# Patient Record
Sex: FEMALE | Race: WHITE | NOT HISPANIC OR LATINO | URBAN - METROPOLITAN AREA
[De-identification: names, ages, dates, MRNs, and addresses within clinical notes are randomized per-mention and may not be internally consistent; named-entity substitution may affect disease eponyms.]

---

## 2018-09-29 ENCOUNTER — EMERGENCY (EMERGENCY)
Facility: HOSPITAL | Age: 58
LOS: 0 days | Discharge: HOME | End: 2018-09-29
Attending: EMERGENCY MEDICINE | Admitting: EMERGENCY MEDICINE

## 2018-09-29 VITALS
RESPIRATION RATE: 19 BRPM | HEIGHT: 60 IN | DIASTOLIC BLOOD PRESSURE: 103 MMHG | HEART RATE: 108 BPM | OXYGEN SATURATION: 98 % | WEIGHT: 165.35 LBS | TEMPERATURE: 97 F | SYSTOLIC BLOOD PRESSURE: 200 MMHG

## 2018-09-29 VITALS — HEART RATE: 92 BPM

## 2018-09-29 DIAGNOSIS — I10 ESSENTIAL (PRIMARY) HYPERTENSION: ICD-10-CM

## 2018-09-29 NOTE — ED ADULT TRIAGE NOTE - CHIEF COMPLAINT QUOTE
" My blood pressure was high x few days." " My blood pressure was high x few days."  Just started on Amlodipine

## 2018-09-29 NOTE — ED PROVIDER NOTE - OBJECTIVE STATEMENT
57 y/o F with PMH of HTN presents for evaluation of elevated BP this morning. Patient was seen by PMD 3 days ago and started on CCB in addition to metoprolol. Patient denies any HA, n/v. No CP, SOB. No abd pain. Patient is asymptomatic.

## 2018-09-29 NOTE — ED PROVIDER NOTE - PROGRESS NOTE DETAILS
Patient will follow-up with her PMD in [place]. Return precautions given. Patient is asymptomatic.

## 2018-11-08 PROBLEM — Z00.00 ENCOUNTER FOR PREVENTIVE HEALTH EXAMINATION: Status: ACTIVE | Noted: 2018-11-08

## 2018-12-24 ENCOUNTER — APPOINTMENT (OUTPATIENT)
Dept: OTOLARYNGOLOGY | Facility: CLINIC | Age: 58
End: 2018-12-24

## 2018-12-25 ENCOUNTER — EMERGENCY (EMERGENCY)
Facility: HOSPITAL | Age: 58
LOS: 0 days | Discharge: HOME | End: 2018-12-26
Attending: EMERGENCY MEDICINE | Admitting: EMERGENCY MEDICINE

## 2018-12-25 VITALS
HEART RATE: 110 BPM | OXYGEN SATURATION: 99 % | SYSTOLIC BLOOD PRESSURE: 216 MMHG | WEIGHT: 15.43 LBS | RESPIRATION RATE: 20 BRPM | TEMPERATURE: 97 F | DIASTOLIC BLOOD PRESSURE: 113 MMHG

## 2018-12-25 DIAGNOSIS — Z90.49 ACQUIRED ABSENCE OF OTHER SPECIFIED PARTS OF DIGESTIVE TRACT: ICD-10-CM

## 2018-12-25 DIAGNOSIS — I10 ESSENTIAL (PRIMARY) HYPERTENSION: ICD-10-CM

## 2018-12-25 DIAGNOSIS — R10.9 UNSPECIFIED ABDOMINAL PAIN: ICD-10-CM

## 2018-12-25 DIAGNOSIS — R11.2 NAUSEA WITH VOMITING, UNSPECIFIED: ICD-10-CM

## 2018-12-25 DIAGNOSIS — R19.7 DIARRHEA, UNSPECIFIED: ICD-10-CM

## 2018-12-26 VITALS — SYSTOLIC BLOOD PRESSURE: 173 MMHG | DIASTOLIC BLOOD PRESSURE: 81 MMHG | HEART RATE: 99 BPM

## 2018-12-26 DIAGNOSIS — Z90.49 ACQUIRED ABSENCE OF OTHER SPECIFIED PARTS OF DIGESTIVE TRACT: Chronic | ICD-10-CM

## 2018-12-26 LAB
ALBUMIN SERPL ELPH-MCNC: 4.5 G/DL — SIGNIFICANT CHANGE UP (ref 3.5–5.2)
ALP SERPL-CCNC: 116 U/L — HIGH (ref 30–115)
ALT FLD-CCNC: 16 U/L — SIGNIFICANT CHANGE UP (ref 0–41)
ANION GAP SERPL CALC-SCNC: 16 MMOL/L — HIGH (ref 7–14)
APTT BLD: 29 SEC — SIGNIFICANT CHANGE UP (ref 27–39.2)
AST SERPL-CCNC: 25 U/L — SIGNIFICANT CHANGE UP (ref 0–41)
BASOPHILS # BLD AUTO: 0.04 K/UL — SIGNIFICANT CHANGE UP (ref 0–0.2)
BASOPHILS NFR BLD AUTO: 0.2 % — SIGNIFICANT CHANGE UP (ref 0–1)
BILIRUB DIRECT SERPL-MCNC: <0.2 MG/DL — SIGNIFICANT CHANGE UP (ref 0–0.2)
BILIRUB INDIRECT FLD-MCNC: SIGNIFICANT CHANGE UP MG/DL (ref 0.2–1.2)
BILIRUB SERPL-MCNC: <0.2 MG/DL — SIGNIFICANT CHANGE UP (ref 0.2–1.2)
BUN SERPL-MCNC: 16 MG/DL — SIGNIFICANT CHANGE UP (ref 10–20)
CALCIUM SERPL-MCNC: 9.3 MG/DL — SIGNIFICANT CHANGE UP (ref 8.5–10.1)
CHLORIDE SERPL-SCNC: 104 MMOL/L — SIGNIFICANT CHANGE UP (ref 98–110)
CO2 SERPL-SCNC: 22 MMOL/L — SIGNIFICANT CHANGE UP (ref 17–32)
CREAT SERPL-MCNC: 0.7 MG/DL — SIGNIFICANT CHANGE UP (ref 0.7–1.5)
EOSINOPHIL # BLD AUTO: 0.08 K/UL — SIGNIFICANT CHANGE UP (ref 0–0.7)
EOSINOPHIL NFR BLD AUTO: 0.4 % — SIGNIFICANT CHANGE UP (ref 0–8)
GLUCOSE SERPL-MCNC: 133 MG/DL — HIGH (ref 70–99)
HCT VFR BLD CALC: 36.5 % — LOW (ref 37–47)
HGB BLD-MCNC: 11.4 G/DL — LOW (ref 12–16)
IMM GRANULOCYTES NFR BLD AUTO: 0.4 % — HIGH (ref 0.1–0.3)
INR BLD: 1.07 RATIO — SIGNIFICANT CHANGE UP (ref 0.65–1.3)
LIDOCAIN IGE QN: 25 U/L — SIGNIFICANT CHANGE UP (ref 7–60)
LYMPHOCYTES # BLD AUTO: 1.24 K/UL — SIGNIFICANT CHANGE UP (ref 1.2–3.4)
LYMPHOCYTES # BLD AUTO: 6.6 % — LOW (ref 20.5–51.1)
MCHC RBC-ENTMCNC: 24.2 PG — LOW (ref 27–31)
MCHC RBC-ENTMCNC: 31.2 G/DL — LOW (ref 32–37)
MCV RBC AUTO: 77.3 FL — LOW (ref 81–99)
MONOCYTES # BLD AUTO: 0.58 K/UL — SIGNIFICANT CHANGE UP (ref 0.1–0.6)
MONOCYTES NFR BLD AUTO: 3.1 % — SIGNIFICANT CHANGE UP (ref 1.7–9.3)
NEUTROPHILS # BLD AUTO: 16.74 K/UL — HIGH (ref 1.4–6.5)
NEUTROPHILS NFR BLD AUTO: 89.3 % — HIGH (ref 42.2–75.2)
NRBC # BLD: 0 /100 WBCS — SIGNIFICANT CHANGE UP (ref 0–0)
PLATELET # BLD AUTO: 298 K/UL — SIGNIFICANT CHANGE UP (ref 130–400)
POTASSIUM SERPL-MCNC: 4.4 MMOL/L — SIGNIFICANT CHANGE UP (ref 3.5–5)
POTASSIUM SERPL-SCNC: 4.4 MMOL/L — SIGNIFICANT CHANGE UP (ref 3.5–5)
PROT SERPL-MCNC: 8 G/DL — SIGNIFICANT CHANGE UP (ref 6–8)
PROTHROM AB SERPL-ACNC: 12.3 SEC — SIGNIFICANT CHANGE UP (ref 9.95–12.87)
RBC # BLD: 4.72 M/UL — SIGNIFICANT CHANGE UP (ref 4.2–5.4)
RBC # FLD: 16.3 % — HIGH (ref 11.5–14.5)
SODIUM SERPL-SCNC: 142 MMOL/L — SIGNIFICANT CHANGE UP (ref 135–146)
WBC # BLD: 18.76 K/UL — HIGH (ref 4.8–10.8)
WBC # FLD AUTO: 18.76 K/UL — HIGH (ref 4.8–10.8)

## 2018-12-26 RX ORDER — SODIUM CHLORIDE 9 MG/ML
3 INJECTION INTRAMUSCULAR; INTRAVENOUS; SUBCUTANEOUS EVERY 8 HOURS
Qty: 0 | Refills: 0 | Status: DISCONTINUED | OUTPATIENT
Start: 2018-12-25 | End: 2018-12-26

## 2018-12-26 RX ORDER — ONDANSETRON 8 MG/1
1 TABLET, FILM COATED ORAL
Qty: 6 | Refills: 0 | OUTPATIENT
Start: 2018-12-26 | End: 2018-12-28

## 2018-12-26 RX ORDER — SODIUM CHLORIDE 9 MG/ML
1000 INJECTION, SOLUTION INTRAVENOUS ONCE
Qty: 0 | Refills: 0 | Status: COMPLETED | OUTPATIENT
Start: 2018-12-26 | End: 2018-12-26

## 2018-12-26 RX ORDER — FAMOTIDINE 10 MG/ML
20 INJECTION INTRAVENOUS ONCE
Qty: 0 | Refills: 0 | Status: COMPLETED | OUTPATIENT
Start: 2018-12-26 | End: 2018-12-26

## 2018-12-26 RX ORDER — METOCLOPRAMIDE HCL 10 MG
10 TABLET ORAL ONCE
Qty: 0 | Refills: 0 | Status: COMPLETED | OUTPATIENT
Start: 2018-12-26 | End: 2018-12-26

## 2018-12-26 RX ORDER — SODIUM CHLORIDE 9 MG/ML
2000 INJECTION, SOLUTION INTRAVENOUS ONCE
Qty: 0 | Refills: 0 | Status: COMPLETED | OUTPATIENT
Start: 2018-12-26 | End: 2018-12-26

## 2018-12-26 RX ADMIN — Medication 10 MILLIGRAM(S): at 00:30

## 2018-12-26 RX ADMIN — FAMOTIDINE 20 MILLIGRAM(S): 10 INJECTION INTRAVENOUS at 01:10

## 2018-12-26 RX ADMIN — SODIUM CHLORIDE 500 MILLILITER(S): 9 INJECTION, SOLUTION INTRAVENOUS at 02:15

## 2018-12-26 RX ADMIN — SODIUM CHLORIDE 500 MILLILITER(S): 9 INJECTION, SOLUTION INTRAVENOUS at 00:30

## 2018-12-26 NOTE — ED PROVIDER NOTE - MEDICAL DECISION MAKING DETAILS
59 yo female with PMH of HTN, anxiety, cholecystectomy presents to the ER for N/V/D and cramping s/p N/V/D. Pt has non tender abdomen, chem panel ok. CBC show leukocytosis likely reactive from vomiting and diarrhea. Repeat exams always with non tender abdomen. Pt tolerated po challenge. Will dc with zofran prescription. Pt already has sucralfate at home. Instructed to patient and son that if pain returns or worsens or localizes she needs to return to the ER. Pt and family understand these instructions.

## 2018-12-26 NOTE — ED ADULT NURSE NOTE - NSIMPLEMENTINTERV_GEN_ALL_ED
Implemented All Universal Safety Interventions:  [place] to call system. Call bell, personal items and telephone within reach. Instruct patient to call for assistance. Room bathroom lighting operational. Non-slip footwear when patient is off stretcher. Physically safe environment: no spills, clutter or unnecessary equipment. Stretcher in lowest position, wheels locked, appropriate side rails in place.

## 2018-12-26 NOTE — ED PROVIDER NOTE - CARE PROVIDER_API CALL
Mary Burch), Gastroenterology  [address] [place] [address]  Phone: [phone]  Fax: [phone]

## 2018-12-26 NOTE — ED PROVIDER NOTE - OBJECTIVE STATEMENT
59 yo female with PMH of HTN, anxiety, cholecystectomy presents to the ER for 57 yo female with PMH of HTN, anxiety, cholecystectomy presents to the ER for N/V/D/abdomen pain for several hours PTA. Pt states she ate an apple and boiled chicken. Soon after starting having N/V x 3-4, then watery diarrhea x 3-4, non bloody. Pt has no fever, no chills, no flank pain, no CP, no SOB, no cough, no rash, no leg swelling/pain. Denies sick contacts, denies travel, denies bad food exposure.     PMH as above  Meds: metoprolol tartrate 50 mg, Escitalopram 10 mg, Amlodipine 10 mg, Sucralfate 1 gm, albuterol MDI  ALL: nkda  SH: no smoking no etoh  PMD Dr Handley

## 2018-12-26 NOTE — ED PROVIDER NOTE - PROGRESS NOTE DETAILS
WBC noted. Likely reactive as pt currently reexamined by me and states nausea improved, no vomiting in the ER and on exam of abdomen has non tender abdomen, no rebound or guarding. Waitinig for Pepcid to go thru iv and chem labs.

## 2018-12-26 NOTE — ED PROVIDER NOTE - NSFOLLOWUPINSTRUCTIONS_ED_ALL_ED_FT
Nausea / Vomiting    Nausea is the feeling that you have an upset stomach or have to vomit. As nausea gets worse, it can lead to vomiting. Vomiting occurs when stomach contents are thrown up and out of the mouth which puts you at an increased risk for dehydration. Older adults and people with other diseases or a weak immune system are at higher risk for dehydration. Drink clear fluids in small but frequent amounts as tolerated. Eat bland, easy-to-digest foods in small amounts as tolerated.     SEEK IMMEDIATE MEDICAL CARE IF YOU HAVE THE FOLLOWING SYMPTOMS: fever, inability to keep fluids down, black or bloody vomitus, black or bloody stools, lightheadedness/dizziness, chest pain, severe headache, rash, shortness of breath, cold or clammy skin, confusion, pain with urination, or any signs of dehydration.     Diarrhea    Diarrhea is frequent loose and watery bowel movements that has many causes. Diarrhea can make you feel weak and cause you to become dehydrated. Diarrhea typically lasts 3-5 days. However, it can last longer if it is a sign of something more serious. Drink clear fluids to prevent dehydration. Eat bland, easy-to-digest foods as tolerated.     SEEK IMMEDIATE MEDICAL CARE IF YOU HAVE THE FOLLOWING SYMPTOMS: fever, lightheadedness/dizziness, chest pain, black or bloody stools, shortness of breath, severe abdominal or back pain, or any signs of dehydration.

## 2020-06-27 ENCOUNTER — EMERGENCY (EMERGENCY)
Facility: HOSPITAL | Age: 60
LOS: 0 days | Discharge: HOME | End: 2020-06-28
Attending: STUDENT IN AN ORGANIZED HEALTH CARE EDUCATION/TRAINING PROGRAM | Admitting: EMERGENCY MEDICINE
Payer: MEDICAID

## 2020-06-27 VITALS
DIASTOLIC BLOOD PRESSURE: 81 MMHG | HEART RATE: 120 BPM | TEMPERATURE: 98 F | RESPIRATION RATE: 18 BRPM | SYSTOLIC BLOOD PRESSURE: 182 MMHG | OXYGEN SATURATION: 98 %

## 2020-06-27 DIAGNOSIS — E87.6 HYPOKALEMIA: ICD-10-CM

## 2020-06-27 DIAGNOSIS — I10 ESSENTIAL (PRIMARY) HYPERTENSION: ICD-10-CM

## 2020-06-27 DIAGNOSIS — R00.2 PALPITATIONS: ICD-10-CM

## 2020-06-27 DIAGNOSIS — Z20.828 CONTACT WITH AND (SUSPECTED) EXPOSURE TO OTHER VIRAL COMMUNICABLE DISEASES: ICD-10-CM

## 2020-06-27 DIAGNOSIS — K21.9 GASTRO-ESOPHAGEAL REFLUX DISEASE WITHOUT ESOPHAGITIS: ICD-10-CM

## 2020-06-27 DIAGNOSIS — Z90.49 ACQUIRED ABSENCE OF OTHER SPECIFIED PARTS OF DIGESTIVE TRACT: Chronic | ICD-10-CM

## 2020-06-27 PROBLEM — F41.9 ANXIETY DISORDER, UNSPECIFIED: Chronic | Status: ACTIVE | Noted: 2018-12-26

## 2020-06-27 LAB
ALBUMIN SERPL ELPH-MCNC: 4.4 G/DL — SIGNIFICANT CHANGE UP (ref 3.5–5.2)
ALP SERPL-CCNC: 91 U/L — SIGNIFICANT CHANGE UP (ref 30–115)
ALT FLD-CCNC: 14 U/L — SIGNIFICANT CHANGE UP (ref 0–41)
ANION GAP SERPL CALC-SCNC: 12 MMOL/L — SIGNIFICANT CHANGE UP (ref 7–14)
ANION GAP SERPL CALC-SCNC: 15 MMOL/L — HIGH (ref 7–14)
APPEARANCE UR: CLEAR — SIGNIFICANT CHANGE UP
AST SERPL-CCNC: 20 U/L — SIGNIFICANT CHANGE UP (ref 0–41)
BASOPHILS # BLD AUTO: 0.01 K/UL — SIGNIFICANT CHANGE UP (ref 0–0.2)
BASOPHILS NFR BLD AUTO: 0.1 % — SIGNIFICANT CHANGE UP (ref 0–1)
BILIRUB DIRECT SERPL-MCNC: <0.2 MG/DL — SIGNIFICANT CHANGE UP (ref 0–0.2)
BILIRUB INDIRECT FLD-MCNC: >0.2 MG/DL — SIGNIFICANT CHANGE UP (ref 0.2–1.2)
BILIRUB SERPL-MCNC: 0.4 MG/DL — SIGNIFICANT CHANGE UP (ref 0.2–1.2)
BILIRUB UR-MCNC: NEGATIVE — SIGNIFICANT CHANGE UP
BUN SERPL-MCNC: 3 MG/DL — LOW (ref 10–20)
BUN SERPL-MCNC: 4 MG/DL — LOW (ref 10–20)
CALCIUM SERPL-MCNC: 8.8 MG/DL — SIGNIFICANT CHANGE UP (ref 8.5–10.1)
CALCIUM SERPL-MCNC: 9.6 MG/DL — SIGNIFICANT CHANGE UP (ref 8.5–10.1)
CHLORIDE SERPL-SCNC: 104 MMOL/L — SIGNIFICANT CHANGE UP (ref 98–110)
CHLORIDE SERPL-SCNC: 106 MMOL/L — SIGNIFICANT CHANGE UP (ref 98–110)
CO2 SERPL-SCNC: 23 MMOL/L — SIGNIFICANT CHANGE UP (ref 17–32)
CO2 SERPL-SCNC: 25 MMOL/L — SIGNIFICANT CHANGE UP (ref 17–32)
COLOR SPEC: SIGNIFICANT CHANGE UP
CREAT SERPL-MCNC: 0.5 MG/DL — LOW (ref 0.7–1.5)
CREAT SERPL-MCNC: 0.6 MG/DL — LOW (ref 0.7–1.5)
D DIMER BLD IA.RAPID-MCNC: 107 NG/ML DDU — SIGNIFICANT CHANGE UP (ref 0–230)
DIFF PNL FLD: NEGATIVE — SIGNIFICANT CHANGE UP
EOSINOPHIL # BLD AUTO: 0.02 K/UL — SIGNIFICANT CHANGE UP (ref 0–0.7)
EOSINOPHIL NFR BLD AUTO: 0.2 % — SIGNIFICANT CHANGE UP (ref 0–8)
GLUCOSE SERPL-MCNC: 103 MG/DL — HIGH (ref 70–99)
GLUCOSE SERPL-MCNC: 113 MG/DL — HIGH (ref 70–99)
GLUCOSE UR QL: NEGATIVE — SIGNIFICANT CHANGE UP
HCT VFR BLD CALC: 37.2 % — SIGNIFICANT CHANGE UP (ref 37–47)
HGB BLD-MCNC: 11.6 G/DL — LOW (ref 12–16)
IMM GRANULOCYTES NFR BLD AUTO: 0.4 % — HIGH (ref 0.1–0.3)
KETONES UR-MCNC: ABNORMAL
LEUKOCYTE ESTERASE UR-ACNC: NEGATIVE — SIGNIFICANT CHANGE UP
LIDOCAIN IGE QN: 11 U/L — SIGNIFICANT CHANGE UP (ref 7–60)
LYMPHOCYTES # BLD AUTO: 1.48 K/UL — SIGNIFICANT CHANGE UP (ref 1.2–3.4)
LYMPHOCYTES # BLD AUTO: 13 % — LOW (ref 20.5–51.1)
MCHC RBC-ENTMCNC: 24.1 PG — LOW (ref 27–31)
MCHC RBC-ENTMCNC: 31.2 G/DL — LOW (ref 32–37)
MCV RBC AUTO: 77.2 FL — LOW (ref 81–99)
MONOCYTES # BLD AUTO: 0.53 K/UL — SIGNIFICANT CHANGE UP (ref 0.1–0.6)
MONOCYTES NFR BLD AUTO: 4.7 % — SIGNIFICANT CHANGE UP (ref 1.7–9.3)
NEUTROPHILS # BLD AUTO: 9.29 K/UL — HIGH (ref 1.4–6.5)
NEUTROPHILS NFR BLD AUTO: 81.6 % — HIGH (ref 42.2–75.2)
NITRITE UR-MCNC: NEGATIVE — SIGNIFICANT CHANGE UP
NRBC # BLD: 0 /100 WBCS — SIGNIFICANT CHANGE UP (ref 0–0)
NT-PROBNP SERPL-SCNC: 226 PG/ML — SIGNIFICANT CHANGE UP (ref 0–300)
PH UR: 6 — SIGNIFICANT CHANGE UP (ref 5–8)
PLATELET # BLD AUTO: 242 K/UL — SIGNIFICANT CHANGE UP (ref 130–400)
POTASSIUM SERPL-MCNC: 3 MMOL/L — LOW (ref 3.5–5)
POTASSIUM SERPL-MCNC: 3.5 MMOL/L — SIGNIFICANT CHANGE UP (ref 3.5–5)
POTASSIUM SERPL-SCNC: 3 MMOL/L — LOW (ref 3.5–5)
POTASSIUM SERPL-SCNC: 3.5 MMOL/L — SIGNIFICANT CHANGE UP (ref 3.5–5)
PROT SERPL-MCNC: 7.3 G/DL — SIGNIFICANT CHANGE UP (ref 6–8)
PROT UR-MCNC: SIGNIFICANT CHANGE UP
RBC # BLD: 4.82 M/UL — SIGNIFICANT CHANGE UP (ref 4.2–5.4)
RBC # FLD: 20.5 % — HIGH (ref 11.5–14.5)
SODIUM SERPL-SCNC: 141 MMOL/L — SIGNIFICANT CHANGE UP (ref 135–146)
SODIUM SERPL-SCNC: 144 MMOL/L — SIGNIFICANT CHANGE UP (ref 135–146)
SP GR SPEC: 1.01 — SIGNIFICANT CHANGE UP (ref 1.01–1.02)
TROPONIN T SERPL-MCNC: <0.01 NG/ML — SIGNIFICANT CHANGE UP
TROPONIN T SERPL-MCNC: <0.01 NG/ML — SIGNIFICANT CHANGE UP
UROBILINOGEN FLD QL: SIGNIFICANT CHANGE UP
WBC # BLD: 11.37 K/UL — HIGH (ref 4.8–10.8)
WBC # FLD AUTO: 11.37 K/UL — HIGH (ref 4.8–10.8)

## 2020-06-27 PROCEDURE — 99220: CPT

## 2020-06-27 PROCEDURE — 93010 ELECTROCARDIOGRAM REPORT: CPT | Mod: 76

## 2020-06-27 PROCEDURE — 71045 X-RAY EXAM CHEST 1 VIEW: CPT | Mod: 26

## 2020-06-27 RX ORDER — POTASSIUM CHLORIDE 20 MEQ
40 PACKET (EA) ORAL ONCE
Refills: 0 | Status: COMPLETED | OUTPATIENT
Start: 2020-06-27 | End: 2020-06-27

## 2020-06-27 RX ORDER — MAGNESIUM SULFATE 500 MG/ML
2 VIAL (ML) INJECTION ONCE
Refills: 0 | Status: COMPLETED | OUTPATIENT
Start: 2020-06-27 | End: 2020-06-27

## 2020-06-27 RX ORDER — SODIUM CHLORIDE 9 MG/ML
1000 INJECTION INTRAMUSCULAR; INTRAVENOUS; SUBCUTANEOUS ONCE
Refills: 0 | Status: COMPLETED | OUTPATIENT
Start: 2020-06-27 | End: 2020-06-27

## 2020-06-27 RX ORDER — PANTOPRAZOLE SODIUM 20 MG/1
40 TABLET, DELAYED RELEASE ORAL ONCE
Refills: 0 | Status: COMPLETED | OUTPATIENT
Start: 2020-06-27 | End: 2020-06-27

## 2020-06-27 RX ORDER — ONDANSETRON 8 MG/1
4 TABLET, FILM COATED ORAL ONCE
Refills: 0 | Status: COMPLETED | OUTPATIENT
Start: 2020-06-27 | End: 2020-06-27

## 2020-06-27 RX ORDER — FAMOTIDINE 10 MG/ML
20 INJECTION INTRAVENOUS ONCE
Refills: 0 | Status: COMPLETED | OUTPATIENT
Start: 2020-06-27 | End: 2020-06-27

## 2020-06-27 RX ADMIN — Medication 40 MILLIEQUIVALENT(S): at 14:43

## 2020-06-27 RX ADMIN — Medication 50 GRAM(S): at 12:42

## 2020-06-27 RX ADMIN — FAMOTIDINE 20 MILLIGRAM(S): 10 INJECTION INTRAVENOUS at 10:42

## 2020-06-27 RX ADMIN — PANTOPRAZOLE SODIUM 40 MILLIGRAM(S): 20 TABLET, DELAYED RELEASE ORAL at 14:29

## 2020-06-27 RX ADMIN — ONDANSETRON 4 MILLIGRAM(S): 8 TABLET, FILM COATED ORAL at 10:43

## 2020-06-27 RX ADMIN — Medication 50 GRAM(S): at 14:41

## 2020-06-27 RX ADMIN — Medication 40 MILLIEQUIVALENT(S): at 11:39

## 2020-06-27 RX ADMIN — SODIUM CHLORIDE 1000 MILLILITER(S): 9 INJECTION INTRAMUSCULAR; INTRAVENOUS; SUBCUTANEOUS at 10:42

## 2020-06-27 RX ADMIN — SODIUM CHLORIDE 1000 MILLILITER(S): 9 INJECTION INTRAMUSCULAR; INTRAVENOUS; SUBCUTANEOUS at 13:44

## 2020-06-27 NOTE — ED ADULT NURSE NOTE - NSIMPLEMENTINTERV_GEN_ALL_ED
Implemented All Universal Safety Interventions:  [place] to call system. Call bell, personal items and telephone within reach. Instruct patient to call for assistance. Room bathroom lighting operational. Non-slip footwear when patient is off stretcher. Physically safe environment: no spills, clutter or unnecessary equipment. Stretcher in lowest position, wheels locked, appropriate side rails in place.

## 2020-06-27 NOTE — ED PROVIDER NOTE - OBJECTIVE STATEMENT
59 y/o female with hx HTN, GERD, Anemia Amharic speaking presents to the ED c/o "I've been having palpitation on-off for 1 month. It is worse after eating. I went to my doctor who gave me some medicine. The palpitations were worse last night." no CP/ SOB/ fever/ chills/ weakness/ abd pain

## 2020-06-27 NOTE — ED CDU PROVIDER INITIAL DAY NOTE - OBJECTIVE STATEMENT
61 y/o female with PMHX of Anxiety and HTN presenting for palpitations. As per pt (via Irish translation from son) for the past month has "felt her heart racing" s/p eating. Pt also complains of epigastric pain and weight loss over the past month 61 y/o female with PMHX of Anxiety and HTN presenting for palpitations. As per pt (via Frisian translation from son) for the past month has "felt her heart racing" s/p eating. Pt also complains of epigastric pain and weight loss over the past month due to the anxiety of the palpitations and pain going to occur. Pt also states palpitations worsen after she takes her Xanax. Denies any dizziness, N/V/D, fever/chills, SOB, vision changes

## 2020-06-27 NOTE — ED CDU PROVIDER INITIAL DAY NOTE - ATTENDING CONTRIBUTION TO CARE
59 yo f hx htn, anemia, anxiety, s/p cholecystectomy and appendectomy (distant) here for abd pain and palpitations. pt states for the past month, she develops palpitations while eating. associated epigastric discomfort and nausea. pt states the pain has made her a anxious with eating so she has been eating less. pt has seen pcp for this and is on carafate and scheduled to get CTAP.  Pt denies GI eval in the past and denies egd. no fever, chills, productive cough (+intermittent dry cough). no black/bloody stool. pt tolerating PO in general. no exertional chest pain. no pleuritic chest pain. no trauma. no syncope. pt endorsing 2-3 bowel movements today- nonbloody.     Pt initially tachycardic in ED to 120, which improved w/ fluids  labs show mild leukocytosis and anemia, + hypokalemia, otherwise negative cardiac labs- trop, ddimer, bnp, negative belly labs- lfts, lipase, ua shows ketones,     vss  gen- NAD, aaox3  card-rrr  lungs-ctab, no wheezing or rhonchi  abd-sntnd, no guarding or rebound, surgical scars noted  neuro- full str/sensation, cn ii-xii grossly intact, normal coordination     likely gastritis/pud, however, given age, will get serial ekg and troponin, given palpitations, will monitor on tele, echo  will continue to hydrate given ketones, replete electrolytes and serial abdominal exam, IV pepcid given    HR <90 during my evaluation and pt endorsing anxiety when she arrived to ED    no indication for CTAP at this point given benign abd, normal LFTs, no e/o obstruction, pt would more likely benefit from close GI f/u for elective EGD

## 2020-06-27 NOTE — ED PROVIDER NOTE - ATTENDING CONTRIBUTION TO CARE
son providing translation at pt request - Pashto     60F PMH gerd, htn, pud, anemia p/w 1 mo of intermittent palpations. states brought on by eating. no cp, sob. no fever, cough. no abd pain, nvdc. no dizziness, loc. no dysuria, freq, hematuria. no brbpr or melena. no cardiologist.     on exam, AFVSS, well federico nad, ncat, eomi, perrla, mmm, lctab, rrr nl s1s2 no mrg, abd soft ntnd, aaox3, no focal deficits, no le edema or calf ttp,     a/p; Palpitations, tachy on arrival cant perc out, will do labs, ekg/trop r/o acs, ddimer r/o pe, r/o anemia/electrolyte abnl, cxr , tele monitor r/o arrythmia, re-eval

## 2020-06-27 NOTE — ED ADULT NURSE NOTE - OBJECTIVE STATEMENT
60 year old female with hx of gerd complaining of palpation for 1 month states it occurs after she eats.

## 2020-06-27 NOTE — ED CDU PROVIDER INITIAL DAY NOTE - PROGRESS NOTE DETAILS
received signout from Andrew Cortés - pt place in obs for anxiety, tachycardia and palpitations after eating; hx of gerd was on carafate; pt with ce/ekg x 2 neg; electrolytes(mg/k) replaced; no cp; pt pending echo in am;

## 2020-06-28 VITALS
DIASTOLIC BLOOD PRESSURE: 78 MMHG | OXYGEN SATURATION: 98 % | RESPIRATION RATE: 18 BRPM | HEART RATE: 88 BPM | TEMPERATURE: 98 F | SYSTOLIC BLOOD PRESSURE: 171 MMHG

## 2020-06-28 LAB — SARS-COV-2 RNA SPEC QL NAA+PROBE: SIGNIFICANT CHANGE UP

## 2020-06-28 PROCEDURE — 93306 TTE W/DOPPLER COMPLETE: CPT | Mod: 26

## 2020-06-28 PROCEDURE — 99217: CPT

## 2020-06-28 RX ORDER — PANTOPRAZOLE SODIUM 20 MG/1
1 TABLET, DELAYED RELEASE ORAL
Qty: 14 | Refills: 0
Start: 2020-06-28 | End: 2020-07-11

## 2020-06-28 NOTE — ED CDU PROVIDER DISPOSITION NOTE - ATTENDING CONTRIBUTION TO CARE
61 yo f hx htn, anemia, anxiety, s/p cholecystectomy and appendectomy (distant) here for abd pain and palpitations. pt states for the past month, she develops palpitations while eating. associated epigastric discomfort and nausea. pt states the pain has made her a anxious with eating so she has been eating less. pt has seen pcp for this and is on carafate and scheduled to get CTAP.  Pt denies GI eval in the past and denies egd. no fever, chills, productive cough (+intermittent dry cough). no black/bloody stool. pt tolerating PO in general. no exertional chest pain. no pleuritic chest pain. no trauma. no syncope. pt endorsing 2-3 bowel movements today- nonbloody.     Pt initially tachycardic in ED to 120, which improved w/ fluids  labs show mild leukocytosis and anemia, + hypokalemia, otherwise negative cardiac labs- trop, ddimer, bnp, negative belly labs- lfts, lipase, ua shows ketone

## 2020-06-28 NOTE — ED CDU PROVIDER DISPOSITION NOTE - CARE PROVIDER_API CALL
Hedy Regalado)  Internal Medicine  [address] [place] [id number]  Phone: [phone]  Fax: [phone]  Follow Up Time:

## 2020-06-28 NOTE — ED CDU PROVIDER DISPOSITION NOTE - CARE PROVIDERS DIRECT ADDRESSES
[email or web address]
no back pain, no gout, no musculoskeletal pain, no neck pain, and no weakness.

## 2020-06-28 NOTE — ED CDU PROVIDER DISPOSITION NOTE - CLINICAL COURSE
In obs, pt kept on telemetry. HR improved w/ fluids. lytes repleted. serial ekg showed improvement of QTc. ECHO normal. Pt tolerating PO. Sx improved w/ protonix. Stable for d/c w/ ppi and gi f/u

## 2020-06-28 NOTE — ED CDU PROVIDER SUBSEQUENT DAY NOTE - HISTORY
pt resting in obs without complaints; son interpreting for pt - pt has home meds with her and instructed to take them as prescribed; pt pending echo in am; covid negative;

## 2020-06-28 NOTE — ED ADULT NURSE REASSESSMENT NOTE - NS ED NURSE REASSESS COMMENT FT1
Echo called for status of patient. Patient awaiting covid-19 test results at this time and cannot be transferred to echo without results. Patient and her son were notified of status and verbalized an understanding. Repeat EKG done and signed by MD Arthur
Pt A+Ox4 and resting comfortably, son at bedside interpreting for pt. Pt denies chest pain/ palpitations/ SOB. Pt awaiting echo.
Patient placed in observation. Continuous cardiac monitoring maintained. IV patent and intact. Patient son at bedside translated Sierra Leonean as per patients wishes. HR improved < 100 bpm after medications. Patient educated on plan of care for echo.

## 2020-06-28 NOTE — ED CDU PROVIDER DISPOSITION NOTE - PATIENT PORTAL LINK FT
You can access the FollowMyHealth Patient Portal offered by [facility] by registering at the following website: [email or web address]. By joining [email or web address]’s FollowMyHealth portal, you will also be able to view your health information using other applications (apps) compatible with our system.

## 2020-06-28 NOTE — ED CDU PROVIDER SUBSEQUENT DAY NOTE - PROGRESS NOTE DETAILS
Patient resting comfortably- feeling better. Was able to eat last night. No palpitations. Will continue to monitor- awaiting echo.

## 2021-05-27 ENCOUNTER — TRANSCRIPTION ENCOUNTER (OUTPATIENT)
Age: 61
End: 2021-05-27

## 2022-11-21 ENCOUNTER — EMERGENCY (EMERGENCY)
Facility: HOSPITAL | Age: 62
LOS: 0 days | Discharge: HOME | End: 2022-11-21
Attending: EMERGENCY MEDICINE | Admitting: EMERGENCY MEDICINE

## 2022-11-21 VITALS
TEMPERATURE: 98 F | SYSTOLIC BLOOD PRESSURE: 209 MMHG | RESPIRATION RATE: 18 BRPM | OXYGEN SATURATION: 99 % | HEART RATE: 100 BPM | DIASTOLIC BLOOD PRESSURE: 102 MMHG

## 2022-11-21 DIAGNOSIS — Z90.49 ACQUIRED ABSENCE OF OTHER SPECIFIED PARTS OF DIGESTIVE TRACT: ICD-10-CM

## 2022-11-21 DIAGNOSIS — S46.912A STRAIN OF UNSPECIFIED MUSCLE, FASCIA AND TENDON AT SHOULDER AND UPPER ARM LEVEL, LEFT ARM, INITIAL ENCOUNTER: ICD-10-CM
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
## 2022-11-21 DIAGNOSIS — M25.512 PAIN IN LEFT SHOULDER: ICD-10-CM

## 2022-11-21 DIAGNOSIS — F41.9 ANXIETY DISORDER, UNSPECIFIED: ICD-10-CM
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
## 2022-11-21 DIAGNOSIS — Z90.49 ACQUIRED ABSENCE OF OTHER SPECIFIED PARTS OF DIGESTIVE TRACT: Chronic | ICD-10-CM
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
## 2022-11-21 DIAGNOSIS — I10 ESSENTIAL (PRIMARY) HYPERTENSION: ICD-10-CM

## 2022-11-21 PROCEDURE — 99283 EMERGENCY DEPT VISIT LOW MDM: CPT

## 2022-11-21 RX ORDER — IBUPROFEN 200 MG
400 TABLET ORAL ONCE
Refills: 0 | Status: COMPLETED | OUTPATIENT
Start: 2022-11-21 | End: 2022-11-21

## 2022-11-21 RX ADMIN — Medication 400 MILLIGRAM(S): at 16:28

## 2022-11-21 NOTE — ED PROVIDER NOTE - NS ED ATTENDING STATEMENT MOD
This was a shared visit with the KARIME. I reviewed and verified the documentation and independently performed the documented:

## 2022-11-21 NOTE — ED PROVIDER NOTE - ATTENDING APP SHARED VISIT CONTRIBUTION OF CARE
62-year-old female history of HTN presents with left shoulder pain today, patient states she was sweeping with a broom, the broom broke and she caught herself with the broom handle and twisted her left shoulder, sx are constant, worse with certain movements and position, better with rest, denies exertional nature to pain, chest pain, SOB, nausea, vomiting, diaphoresis, hemoptysis, fever, tactile temp, chills, paresthesias, focal weakness, or other associated complaints at present. Pt denies recent heavy lifting or trauma. Old chart reviewed. I have reviewed and agree with the initial nursing note, except as documented in my note.    VSS, awake, alert, non-toxic appearing, no skin rash or lesions, chest CTAB, no w/r/r, +S1/S2, RRR, no m/r/g, abdomen soft, NT, ND, +BS, LUE: No scapular, clavicle, AC joint, elbow, wrist, hand tenderness, motor and sensation intact distally, NV intact distally with 2+ radial pulses, shoulder; appears symmetrical, no gross deformity, no anterior fullness of anterior shoulder, humeral head palpated in glenohumeral joint, no crepitus, point tenderness, erythema, warmth, skin breakdown or lesions, diffusely tender to palpation, sensation intact over deltoid region, active ROM painful but intact, passive ROM intact.

## 2022-11-21 NOTE — ED ADULT TRIAGE NOTE - CHIEF COMPLAINT QUOTE
pt was vacuuming when she pulled her left shoulder about  1 hour. able to move arm but has pain. pt has hx of htn. took her BP medsPTA pt was vacuuming when she pulled her left shoulder about  1 hour ago. denies pain when touched. able to move arm but has pain. pt has hx of htn. took her BP medsPTA

## 2022-11-21 NOTE — ED ADULT NURSE NOTE - CHIEF COMPLAINT QUOTE
pt was vacuuming when she pulled her left shoulder about  1 hour ago. denies pain when touched. able to move arm but has pain. pt has hx of htn. took her BP medsPTA

## 2022-11-21 NOTE — ED PROVIDER NOTE - NSFOLLOWUPINSTRUCTIONS_ED_ALL_ED_FT
Follow up with your orthopedic doctor in 5-7 days.  If you do not have an orthopedic doctor, CALL [phone] to find a physician to follow up with.      Shoulder Sprain       A shoulder sprain is a partial or complete tear in one of the tough, fiber-like tissues (ligaments) in the shoulder. The ligaments in the shoulder help to hold the shoulder in place.      What are the causes?    This condition may be caused by:  •A fall.      •A hit to the shoulder.      •A twist of the arm.        What increases the risk?    You are more likely to develop this condition if you:  •Play sports.      •Have problems with balance or coordination.        What are the signs or symptoms?    Symptoms of this condition include:  •Pain when moving the shoulder.      •Limited ability to move the shoulder.      •Swelling and tenderness on top of the shoulder.      •Warmth in the shoulder.      •A change in the shape of the shoulder.      •Redness or bruising on the shoulder.        How is this diagnosed?    This condition is diagnosed with:  •A physical exam. During the exam, you may be asked to do simple exercises with your shoulder.      •Imaging tests such as X-rays, MRI, or a CT scan. These tests can show how severe the sprain is.        How is this treated?    This condition may be treated with:  •Rest.      •Pain medicine.      •Ice.      •A sling or brace. This is used to keep the arm still while the shoulder is healing.      •Physical therapy or rehabilitation exercises. These help to improve the range of motion and strength of the shoulder.      •Surgery (rare). Surgery may be needed if the sprain caused a joint to become unstable. Surgery may also be needed to reduce pain.      Some people may develop ongoing shoulder pain or lose some range of motion in the shoulder. However, most people do not develop long-term problems.      Follow these instructions at home:     If you have a sling or brace:     •Wear the sling or brace as told by your health care provider. Remove it only as told by your health care provider.      •Loosen the sling or brace if your fingers tingle, become numb, or turn cold and blue.      •Keep the sling or brace clean.    •If the sling or brace is not waterproof:  •Do not let it get wet.      •Cover it with a watertight covering when you take a bath or shower.        Activity     •Rest your shoulder.      •Move your arm only as much as told by your health care provider, but move your hand and fingers often to prevent stiffness and swelling.      •Return to your normal activities as told by your health care provider. Ask your health care provider what activities are safe for you.      •Ask your health care provider when it is safe for you to drive if you have a sling or brace on your shoulder.      •If you were shown how to do any exercises, do them as told by your health care provider.      General instructions   •If directed, put ice on the affected area.  •Put ice in a plastic bag.      •Place a towel between your skin and the bag.      •Leave the ice on for 20 minutes, 2–3 times a day.        •Take over-the-counter and prescription medicines only as told by your health care provider.      • Do not use any products that contain nicotine or tobacco, such as cigarettes, e-cigarettes, and chewing tobacco. These can delay healing. If you need help quitting, ask your health care provider.      •Keep all follow-up visits as told by your health care provider. This is important.        Contact a health care provider if:    •Your pain gets worse.      •Your pain is not relieved with medicines.      •You have increased redness or swelling.        Get help right away if:    •You have a fever.      •You cannot move your arm or shoulder.      •You develop severe numbness or tingling in your arm, hand, or fingers.      •Your arm, hand, or fingers feel cold and turn blue, white, or gray.        Summary    •A shoulder sprain is a partial or complete tear in one of the tough, fiber-like tissues (ligaments) in the shoulder.      •This condition may be caused by a fall, a hit to the shoulder, or a twist of the arm.      •Treatment usually includes rest, ice, and pain medicine as needed.      •If you have a sling or brace, wear it as told by your health care provider. Remove it only as told by your health care provider.

## 2022-11-21 NOTE — ED PROVIDER NOTE - PHYSICAL EXAMINATION
Gen: NAD, AOx3  Head: NCAT  HEENT: PERRL, oral mucosa moist, normal conjunctiva, oropharynx clear without exudate or erythema  Lung: CTAB, no respiratory distress, no wheezing, rales, rhonchi  CV: normal s1/s2, rrr, Normal perfusion, pulses 2+ throughout  Abd: soft, NTND, no CVA tenderness  MSK: No edema, no visible deformities, full range of motion in all 4 extremities, +Natarajan/Neer to LUE   Neuro: CN II-XII grossly intact, No focal neurologic deficits, strength 5/5 BUE/BLE, steady gait   Skin: No rash   Psych: normal affect

## 2022-11-21 NOTE — ED PROVIDER NOTE - PATIENT PORTAL LINK FT
You can access the FollowMyHealth Patient Portal offered by [facility] by registering at the following website: [email or web address]. By joining [employer]’s FollowMyHealth portal, you will also be able to view your health information using other applications (apps) compatible with our system.

## 2022-11-21 NOTE — ED ADULT NURSE NOTE - OBJECTIVE STATEMENT
son states pt pulled her L shoulder while vacumming. pt able to move L arm and + radial pulse. capillary refill <3 sec

## 2023-04-19 ENCOUNTER — APPOINTMENT (OUTPATIENT)
Dept: ORTHOPEDIC SURGERY | Facility: CLINIC | Age: 63
End: 2023-04-19
Payer: MEDICAID

## 2023-04-19 VITALS — WEIGHT: 160 LBS | HEIGHT: 65 IN | BODY MASS INDEX: 26.66 KG/M2

## 2023-04-19 PROCEDURE — 99204 OFFICE O/P NEW MOD 45 MIN: CPT | Mod: 25

## 2023-04-19 PROCEDURE — 20610 DRAIN/INJ JOINT/BURSA W/O US: CPT | Mod: LT

## 2023-04-19 PROCEDURE — 73562 X-RAY EXAM OF KNEE 3: CPT | Mod: 50

## 2023-04-19 RX ORDER — ACETAMINOPHEN 500 MG/1
500 TABLET ORAL 3 TIMES DAILY
Qty: 120 | Refills: 0 | Status: ACTIVE | COMMUNITY
Start: 2023-04-19 | End: 1900-01-01

## 2023-04-19 RX ORDER — LIDOCAINE 4 G/100G
4 PATCH TOPICAL
Qty: 30 | Refills: 5 | Status: ACTIVE | COMMUNITY
Start: 2023-04-19 | End: 1900-01-01

## 2023-04-19 RX ORDER — MELOXICAM 7.5 MG/1
7.5 TABLET ORAL
Qty: 30 | Refills: 1 | Status: ACTIVE | COMMUNITY
Start: 2023-04-19 | End: 1900-01-01

## 2023-04-19 NOTE — CONSULT LETTER
[FreeTextEntry3] : She has had a couple month history of pain that is chiefly in her right nipple.  It basically came out of nowhere.  She is a healthy non-smoking nondrinking person in her 60s.  She does have some ulcer history and does not take anti-inflammatories.  She did try some Tylenol and it did not help her much but it was at a relatively low dose.  She has not had locking or giving way.  She has had no signs or symptoms consistent with DVT\par \par KNEE EXAM\par There range of motion is preserved.  They are slightly uncomfortable at the patellofemoral joint with compression and with resisted straight leg raising.  There is a very mild effusion and they do have joint line tenderness medial greater than lateral.  Anterior and posterior drawer are negative.  The knee is stable to varus valgus stress there neurovascular intact without signs or symptoms of DVT\par \par This note was dictated using voice recognition software and it is possible that there may have been errors of dictation.  Care was taken to review these errors and to correct them but it is possible that some may have been missed.  It is rare, but possible,  that these errors may cause a change in the substance of the note.    If there are any concerns or questions, please do not hesitate to contact us at [phone] to clarify\par  I reviewed their  follow-up /  new consult form that includes their current medications, allergies list of medical problems and previous hospitalizations and surgical procedures about the problem that has brought them in.  It also lists a pain scale both at rest and activity numerical 0-10.  It was the onset of the problem and the things that make it worse and better.  It lists their treatment up to date and their imaging up to date.  If further includes their social history and review of systems\par \par \par \par Location:  \par \par Quality: dull \par \par  Severity: 5/10 \par \par  Timing: intermittent, worse at night and after activity \par \par  Context: with activity and increased use \par \par Modifying factors: physical modalities such as heating pads or ice or pain cream or orthotics have not been employed usefully.  A therapeutic trial of OTC NSAIDS such as Ibuprofren 600mg (3 OTC pills) TID or Naproxen 450mg (2 over the counter pills) BID  and Acetaminophen  1000mg  (2 extra strength 500mg tablets or 3 regular strength 325mg tablets) has not been fully executed but intermittent use of similar has given partial temporary relief \par \par Associated signs and symptoms/Pertinent negatives: They have no signs or symptoms of DVT or PE.  They have not had any chest pain or shortness of breath or calf swelling.  They have not had constitutional signs of weight loss or night sweats or easy bruisability.  They have not had any particular exposure to ticks or Lyme disease or presence of rashes\par \par OVERALL PHYSICAL EXAM\par GENERAL: Well developed well nourished in no acute distress \par \par MENTAL:Alert and oriented x3, normal affect, Pleasant and accompanied by family \par \par MUSCULOSKELETAL: Ambulating independently \par \par HEENT: NCAT, EOM intact, mucosa moist, neck supple with adequate free rom \par \par CARDIOVASCULAR/HEMATOLOGICAL: no JVD, no peripheral edema, good capillary refill,  skin warm and well perfused, no venous stasis ulcers, pulses intact, no pallor or superficial non-traumatic bruising \par \par NEUROLOGICAL: free passive rom without rigidity or cog wheel motion \par \par RESPIRATORY: no gross stridor or wheezing or increased respiratory effort or use of O2, good capil refill and color \par \par INTEGUMENTARY: skin intact without obvious suspicious lesions where examined \par \par OSTEOPENIA\par We discussed with them the maintenance of bone health through weightbearing activities and general health measures such as smoking cessation, diabetic control and proper weight maintenance.  We did advocate that they take vitamin D 1 to 5000 units a day and calcium citrate 500 mg a day.  This can be obtained over-the-counter.  We stressed that they should take calcium citrate not calcium carbonate which is more like carbon/chalk and is not as well absorbed.  We encourage them to speak to their primary care physician as regards the issue of whether or not they should get a bone density test and possibly be on adjunct of treatment such as Prolia, Fosamax etc.\par \par Right knee does have a mild effusion and discomfort.  She really cannot take anti-inflammatories and after cleansing the area with alcohol and letting it dry from an anterolateral approach we did inject her\par After discussing with them at length the nature of their problem and the alternatives of different treatments, we further discussed the risks and benefits and expected outcome of a steroid injection with numbing medicine.  They understand that this often will cause at least temporary improvement.  They also understand that it helps us confirm that the area injected is the pain generator.  They understand the small risk of infection and that sometimes the anti-inflammatory effect can take up to 2 weeks to work.  They understand that usually the improvement lasts a few months but often it lasts less time.  They do understand that occasionally the injection does put out the inflammation and the problem goes away for a very extended timeframe.  They had the  opportunity to have all their questions answered and under sterile conditions we have injected them with long and short acting numbing medicine (4 cc of 2% Marcaine or equivalent and 4 cc of lidocaine and 20 mg of dexamethasone) with good immediate relief of pain.\par We are going to send her for an MRI and waiting to see how she does with the injection and a prescription dose of Tylenol and calcium and vitamin D we will see her back in about 3 weeks

## 2023-04-27 ENCOUNTER — APPOINTMENT (OUTPATIENT)
Dept: MRI IMAGING | Facility: CLINIC | Age: 63
End: 2023-04-27
Payer: MEDICAID

## 2023-04-27 PROCEDURE — 73721 MRI JNT OF LWR EXTRE W/O DYE: CPT | Mod: RT

## 2023-05-10 ENCOUNTER — APPOINTMENT (OUTPATIENT)
Dept: ORTHOPEDIC SURGERY | Facility: CLINIC | Age: 63
End: 2023-05-10
Payer: MEDICAID

## 2023-05-10 PROCEDURE — 99213 OFFICE O/P EST LOW 20 MIN: CPT

## 2023-05-10 RX ORDER — MELOXICAM 7.5 MG/1
7.5 TABLET ORAL
Qty: 30 | Refills: 1 | Status: ACTIVE | COMMUNITY
Start: 2023-05-10 | End: 1900-01-01

## 2023-05-10 RX ORDER — ACETAMINOPHEN 500 MG/1
500 TABLET ORAL 3 TIMES DAILY
Qty: 120 | Refills: 0 | Status: ACTIVE | COMMUNITY
Start: 2023-05-10 | End: 1900-01-01

## 2023-05-11 NOTE — DISCUSSION/SUMMARY
[de-identified] : She is doing much better as regards her pain in her knees.  She took the medicine and stopped it and she still is quite improved.  Just to be careful we are going to give her a refill to take as needed if the pain and swelling come back.  If she does that and the medicine does not make it better then she will come back in for reevaluation but we are hopeful that she will have a permanent improvement

## 2023-06-19 ENCOUNTER — NON-APPOINTMENT (OUTPATIENT)
Age: 63
End: 2023-06-19

## 2024-06-03 ENCOUNTER — APPOINTMENT (OUTPATIENT)
Dept: ORTHOPEDIC SURGERY | Facility: CLINIC | Age: 64
End: 2024-06-03
Payer: MEDICAID

## 2024-06-03 DIAGNOSIS — M23.92 UNSPECIFIED INTERNAL DERANGEMENT OF LEFT KNEE: ICD-10-CM

## 2024-06-03 DIAGNOSIS — M23.91 UNSPECIFIED INTERNAL DERANGEMENT OF RIGHT KNEE: ICD-10-CM

## 2024-06-03 DIAGNOSIS — M85.89 OTHER SPECIFIED DISORDERS OF BONE DENSITY AND STRUCTURE, MULTIPLE SITES: ICD-10-CM

## 2024-06-03 PROCEDURE — 99213 OFFICE O/P EST LOW 20 MIN: CPT

## 2024-06-03 RX ORDER — LIDOCAINE 5% 700 MG/1
5 PATCH TOPICAL
Qty: 10 | Refills: 0 | Status: ACTIVE | COMMUNITY
Start: 2024-06-03 | End: 1900-01-01

## 2024-06-03 RX ORDER — ACETAMINOPHEN 500 MG/1
500 TABLET ORAL 3 TIMES DAILY
Qty: 120 | Refills: 0 | Status: ACTIVE | COMMUNITY
Start: 2024-06-03 | End: 1900-01-01

## 2024-06-03 RX ORDER — MELOXICAM 7.5 MG/1
7.5 TABLET ORAL
Qty: 30 | Refills: 0 | Status: ACTIVE | COMMUNITY
Start: 2024-06-03 | End: 1900-01-01

## 2024-06-03 RX ORDER — DICLOFENAC SODIUM 1% 10 MG/G
1 GEL TOPICAL DAILY
Qty: 1 | Refills: 4 | Status: ACTIVE | COMMUNITY
Start: 2024-06-03 | End: 1900-01-01

## 2024-06-03 RX ORDER — CHOLECALCIFEROL (VITAMIN D3) 50 MCG
50 MCG TABLET ORAL
Qty: 60 | Refills: 1 | Status: ACTIVE | COMMUNITY
Start: 2024-06-03 | End: 1900-01-01

## 2024-07-08 ENCOUNTER — APPOINTMENT (OUTPATIENT)
Dept: ORTHOPEDIC SURGERY | Facility: CLINIC | Age: 64
End: 2024-07-08

## 2024-07-29 ENCOUNTER — APPOINTMENT (OUTPATIENT)
Dept: ORTHOPEDIC SURGERY | Facility: CLINIC | Age: 64
End: 2024-07-29

## 2024-07-31 NOTE — ED PROVIDER NOTE - OBJECTIVE STATEMENT
no concerns
61 yo female with a pmh of HTN presents c/o L shoulder pain. pt states she was sweeping and the broom broke. pt denies any fall or trauma. pt notes aggravation of the pain with movement and denies any radiation. pt denies any other symptoms including fevers, chill, headache, recent illness/travel, cough, abdominal pain, chest pain, or SOB.

## 2024-12-10 NOTE — ED ADULT NURSE NOTE - BREATHING, MLM
1541 - Pt to PACU 7 from OR.  Bedside report from anesthesiologist and RN.  Attached to monitoring, VSS, breathing is calm and unlabored. 6L mask titrated to room air.  Incision behind ear on left side is approximate, no signs bleeding or hematoma.     1551 - Pt having some water, denies pain or nausea.     1557 - Daughter brought bedside, pt now having popsicle.     1622 - Pt medicated for reported pain and nausea. VSS, room air. Cold pack placed over incision.     1720 - Pt stable to discharge. Instructions given, patient and daughter verbalize understanding. IV And armbands removed.  Taken via wheelchair to car. Pt has all belongings with them.     
Spontaneous, unlabored and symmetrical

## 2024-12-11 NOTE — ED ADULT NURSE NOTE - [id number]_ED_A_ED
Encounter addended by: Juancho Gage RN on: 12/11/2024 2:36 PM   Actions taken: Contacts section saved, Flowsheet accepted No

## 2024-12-16 NOTE — ED ADULT TRIAGE NOTE - NS ED NURSE DIRECT TO ROOM YN
Called patient, relayed provider message to patient, patient verbalizes understanding of provider message. Follow-up appt made for Feb. With patient's pcp.       Rae Grijalva, MSN, RN   [facility]        No